# Patient Record
Sex: MALE | Race: WHITE | URBAN - METROPOLITAN AREA
[De-identification: names, ages, dates, MRNs, and addresses within clinical notes are randomized per-mention and may not be internally consistent; named-entity substitution may affect disease eponyms.]

---

## 2017-11-08 ENCOUNTER — OFFICE VISIT (OUTPATIENT)
Dept: ORTHOPEDIC SURGERY | Age: 37
End: 2017-11-08

## 2017-11-08 VITALS
HEIGHT: 74 IN | BODY MASS INDEX: 32.08 KG/M2 | DIASTOLIC BLOOD PRESSURE: 101 MMHG | HEART RATE: 75 BPM | SYSTOLIC BLOOD PRESSURE: 144 MMHG | WEIGHT: 250 LBS | RESPIRATION RATE: 16 BRPM | TEMPERATURE: 98.9 F

## 2017-11-08 DIAGNOSIS — M47.817 FACET ARTHRITIS OF LUMBOSACRAL REGION: ICD-10-CM

## 2017-11-08 DIAGNOSIS — M43.17 SPONDYLOLISTHESIS AT L5-S1 LEVEL: Primary | ICD-10-CM

## 2017-11-08 PROCEDURE — 99203 OFFICE O/P NEW LOW 30 MIN: CPT | Performed by: PHYSICIAN ASSISTANT

## 2017-11-08 RX ORDER — OMEPRAZOLE 40 MG/1
40 CAPSULE, DELAYED RELEASE ORAL DAILY
COMMUNITY

## 2017-11-09 NOTE — PROGRESS NOTES
Procedure Laterality Date    FACIAL RECONSTRUCTION SURGERY      SHOULDER SURGERY         Social History     Occupational History    Not on file. Social History Main Topics    Smoking status: Never Smoker    Smokeless tobacco: Never Used    Alcohol use Yes      Comment: Has a beer every once in a while     Drug use: No    Sexual activity: Yes     Partners: Female       Current Outpatient Prescriptions   Medication Sig Dispense Refill    omeprazole (PRILOSEC) 40 MG delayed release capsule Take 40 mg by mouth daily      [facility] METHYLPREDNISOLONE, MEDROL DOSPACK, TAPER        No current facility-administered medications for this visit. Objective:     He is alert, oriented x 3, pleasant, well nourished, developed and in no acute distress. BP (!) 144/101   Pulse 75   Temp 98.9 °F (37.2 °C) (Temporal)   Resp 16   Ht 6' 2\" (1.88 m)   Wt 250 lb (113.4 kg)   BMI 32.10 kg/m²        Lumbar Spine Exam:  Deformity: Absent . Soft Tissue Swelling: Absent . Soft Tissue Tenderness: Absent . Midline Bone Tenderness:Present. Paraspinal Muscular Spasm: Present. Previous Incisions: Absent . Erythema Absent . Lumbar Flexion does not produce pain. Lumbar Extension as well as extension and rotation does produce pain.       Range of Motion: Lumbar spine in degrees           Flexion        Extension       Right Bend        Left Bend                                                                     Motor Exam:                                Normal=N   Weak=W   Unable=U   Toe Walk Heel Walk Single Leg Squat   Right            n           n               n   Left            n           n               n     Strength Scale: 1-5   Extensor Hallucis Longus L5 Anterior Tibialis   L4 Quadriceps   L2,3,4    Right             5            5         5   Left             5            5         5     Reflex Exam: 0-4+   Achilles Tendon (gastroc)   S1 Patellar Tendon (quads)   L4   Right                 2+ reviewed in detail. Natural history/expected course discussed, and patient's questions answered  Proper lifting/bending technique discussed  Stretching exercises discussed with patient    We'll give him a few days longer see if the steroids Can and start helping with his back pain. Once he is finished with the Medrol Dosepak he will switch over to ibuprofen 600 mg 3 times a day. Follow Up: 2 weeks. If no significant improvement we'll consider MRI of the lumbar spine to assess the severity of the facet arthritis and potentially plan for facet joint injections. Call or return to clinic prn if these symptoms worsen or fail to improve as anticipated.